# Patient Record
Sex: FEMALE | Race: WHITE | Employment: PART TIME | URBAN - METROPOLITAN AREA
[De-identification: names, ages, dates, MRNs, and addresses within clinical notes are randomized per-mention and may not be internally consistent; named-entity substitution may affect disease eponyms.]

---

## 2020-05-22 ENCOUNTER — MEDICAL CORRESPONDENCE (OUTPATIENT)
Dept: HEALTH INFORMATION MANAGEMENT | Facility: CLINIC | Age: 45
End: 2020-05-22

## 2020-05-27 ENCOUNTER — TRANSCRIBE ORDERS (OUTPATIENT)
Dept: OTHER | Age: 45
End: 2020-05-27

## 2020-05-27 DIAGNOSIS — I82.409 DVT (DEEP VENOUS THROMBOSIS) (H): Primary | ICD-10-CM

## 2020-05-27 DIAGNOSIS — D66 CONGENITAL FACTOR VIII DISORDER (H): ICD-10-CM

## 2020-06-16 ENCOUNTER — VIRTUAL VISIT (OUTPATIENT)
Dept: HEMATOLOGY | Facility: CLINIC | Age: 45
End: 2020-06-16
Attending: INTERNAL MEDICINE
Payer: COMMERCIAL

## 2020-06-16 VITALS — WEIGHT: 157 LBS

## 2020-06-16 DIAGNOSIS — Z86.711 HISTORY OF PULMONARY EMBOLISM: ICD-10-CM

## 2020-06-16 DIAGNOSIS — D68.51 FACTOR V LEIDEN MUTATION (H): ICD-10-CM

## 2020-06-16 DIAGNOSIS — Z86.718 HISTORY OF DEEP VENOUS THROMBOSIS: Primary | ICD-10-CM

## 2020-06-16 PROCEDURE — 99205 OFFICE O/P NEW HI 60 MIN: CPT | Mod: 95 | Performed by: INTERNAL MEDICINE

## 2020-06-16 RX ORDER — RIVAROXABAN 15 MG-20MG
KIT ORAL
COMMUNITY
Start: 2020-05-22 | End: 2023-07-19

## 2020-06-16 RX ORDER — MAGNESIUM OXIDE 400 MG/1
400 TABLET ORAL
COMMUNITY

## 2020-06-16 RX ORDER — DM/P-EPHED/ACETAMINOPH/DOXYLAM
5000 LIQUID (ML) ORAL
COMMUNITY

## 2020-06-16 NOTE — PROGRESS NOTES
[facility]  [facility]  [facility] [address], [place] [address]  Main: [phone], Fax: [phone]        Outpatient Clinic Visit  Date:  06/16/2020    NOTE:  Due to the ongoing COVID-19 pandemic, this visit was conducted by video, with the patient's approval.  Note that part way through the visit, due to technical issues with the video platform, the visit had to be converted to telephone.      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    She has a history of a left lower extremity deep vein thrombosis in approximately 2008.  At that time she had on oral contraceptives for at least several years.  She was subsequently found to be heterozygous for factor V Leiden.  She was anticoagulated with warfarin which was continued for approximately 1 year due to slow resolution of the clot.  Her understanding is that a follow-up ultrasound did, however, demonstrate complete clot resolution.  Because this is felt to be a provoked clot, she was not kept on long-term anticoagulation.    She presented about 3 weeks ago with a 2-week history of left leg pain and swelling.  This led to an ultrasound which was performed on May 22, 2020 that demonstrated an acute left posterior tibial deep vein thrombosis located in the mid calf.  This clot was not seen on prior ultrasound studies from January 2017 and April 2016.  She was placed on rivaroxaban.  Later that day, due to concerns about shortness of breath and chest discomfort she was seen in the emergency department where CT scan was performed and revealed small peripheral subsegmental pulmonary emboli in the right lower lobe and left lower lobe.  She had been having issues with shortness of breath that was slow to resolve following a severe respiratory infection back in February 2020.  In retrospect, she thinks this was likely  COVID-19 infection.    She returned to the emergency department on May 26, 2020 again with concerns about shortness of breath.  Echocardiogram was normal.  It was decided not to repeat the CT scan, and she was continued on rivaroxaban.    At the present time she is feeling better.  Her left leg symptoms resolved within a few days of starting anticoagulation.  She feels that her breathing is slowly improving and she has now just transitioned from the twice daily to the once daily dosing of rivaroxaban.  She denies any bleeding issues.    Overall, she feels healthy.  She is quite active, exercising regularly.  She [place] bike riding [place]  and 7-year-old son.    In addition to the past history of deep vein thrombosis, her past medical history is remarkable for a left upper extremity melanoma which was resected in 2011.  She has no other significant underlying health problems.  Current medications include rivaroxaban 20 mg once daily, magnesium and vitamin D supplements.    Family history is remarkable for a paternal aunt with a history of deep vein thrombosis and factor V Leiden.  Her first cousin (the same aunt's son) also has a history of thrombosis.  Details are unknown.  Her father has factor V Leiden but no history of thrombosis.  She has a sister with factor V Leiden and no history of thrombosis.  That sister had 3 pregnancies without complication.  Her maternal uncle also has factor V Leiden but no history of clotting.    Social history: She works as an endocrinologist.  No history of tobacco or alcohol abuse.  She is  and has a 7-year-old son.    Physical exam: Not performed.    Labs: Recent labs from May 2020 show normal CBC, creatinine, and LFTs.      ASSESSMENT / PLAN:  1.  History of left lower extremity deep thrombosis in approximately 2008.    Felt to be provoked, although she had been on oral contraceptives for at least several years    Treated with  warfarin for approximately 1 year    Ultrasound documented resolution of the clot  2.  Factor V Leiden, presumed heterozygous.  3.  Unprovoked distal left lower extremity deep vein thrombosis and bilateral pulmonary emboli, May 2020.    Given her history of now recurrent, and unprovoked venous thromboembolism, I would recommend long-term anticoagulation.  In addition, she has factor V Leiden (presumably heterozygous), although we discussed that this is a weak genetic risk factor.  Based on her family history, there is low suspicion for an additional identifiable thrombophilia present.  Thus, we decided not to proceed with additional hypercoagulable testing at this time, as it would not alter our treatment recommendations.    We discussed the importance of taking rivaroxaban at strict 24-hour dosing intervals and also with food to ensure proper absorption.  We also discussed the basic approach to brief interruptions in anticoagulation for invasive procedures.  She will contact us for further discussion in that regard should the need arise.    Additional recommendations:    Repeat ultrasound to assess for clot resolution at 3 to 6 months.    Remain up-to-date with age-appropriate cancer screening.    Annual reassessment of long-term anticoagulation plan.      Total time 60 minutes, all in counseling and coordination of care.        Fausto Zamora MD  Associate Professor of Medicine  Division of Hematology, Oncology, and Transplantation  Director, [facility]

## 2020-06-16 NOTE — PROGRESS NOTES
Patient was contacted to complete the pre-visit call prior to their video visit with the provider.  The following statement was read:       This visit will be billed to your insurance the same as an in-person visit. Because of Coronavirus we are instituting video visits when possible to keep everyone safe. This video visit will be conducted between you and the provider.  This service lets us provide the care you need with a video conversation.  If a prescription is necessary, we can send it directly to your pharmacy.If lab work or other testing is needed, we can help arrange a place/time for that to be done at a later date.If during the course of the call the provider feels a video visit is not appropriate, then your insurance company will not be billed.       Allergies and medications were reviewed and travel screening complete.     A test connection was Completed with Pt? No    I thanked them for their time to cover this information     Jean Paul Rois CMA

## 2020-11-17 ENCOUNTER — TELEPHONE (OUTPATIENT)
Dept: HEMATOLOGY | Facility: CLINIC | Age: 45
End: 2020-11-17

## 2020-11-17 NOTE — TELEPHONE ENCOUNTER
Britt Brunson  [id number]  1975    Britt Brunson called today to let us know that her lap hip surgery is going to take place at [facility].  She didn't know the exact timing of the surgery yet.  Reviewed Dr. Zamora's [place] message with anticoagulation plan.  She asked if she could take Ibuprofen for her post-op pain.  Dr. Zamora said that was fine and relayed this message to her.   Janene Rosales, MSN, RN, PHN -Nurse Clinician, [facility] [phone]

## 2020-12-18 ENCOUNTER — TELEPHONE (OUTPATIENT)
Dept: ONCOLOGY | Facility: CLINIC | Age: 45
End: 2020-12-18

## 2020-12-18 NOTE — TELEPHONE ENCOUNTER
Brief Heme/Onc On-Call Note  I received a page from the on-call line about whether the patient could take indomethacin for hip pain while also on Xarelto.    In review, Britt Brunson has a history of recurrent VTE for which she is on Xarelto.    She was prescribed a 4 day course of indomethacin after her lapparascopic hip labral surgery earlier today, and was wondering if she could continue to take that when she resumes her Xarelto tomorrow (she held it the last two days prior to surgery).    We reviewed Dr. Zamora's notes from earlier this year, including the telephone note from 11/17 which addressed NSAID use post-op. Based on his previous recommendations, and the similar half life between indomethacin and ibuprofen, I alerted her that it would be okay to take the indomethacin as she resumed her Xarelto.    However, I cautioned her that should she have obvious bleeding, or dark/black stools, with the combination of medications that she should call the on-call line or [facility] directly for further instructions.    Jono Day MD PhD  Heme/Onc/Transplant Fellow  Pgr [phone]

## 2021-01-04 ENCOUNTER — HEALTH MAINTENANCE LETTER (OUTPATIENT)
Age: 46
End: 2021-01-04

## 2021-05-29 ENCOUNTER — RECORDS - HEALTHEAST (OUTPATIENT)
Dept: ADMINISTRATIVE | Facility: CLINIC | Age: 46
End: 2021-05-29

## 2021-05-30 ENCOUNTER — RECORDS - HEALTHEAST (OUTPATIENT)
Dept: ADMINISTRATIVE | Facility: CLINIC | Age: 46
End: 2021-05-30

## 2021-05-31 ENCOUNTER — RECORDS - HEALTHEAST (OUTPATIENT)
Dept: ADMINISTRATIVE | Facility: CLINIC | Age: 46
End: 2021-05-31

## 2021-06-01 ENCOUNTER — RECORDS - HEALTHEAST (OUTPATIENT)
Dept: ADMINISTRATIVE | Facility: CLINIC | Age: 46
End: 2021-06-01

## 2021-06-02 ENCOUNTER — RECORDS - HEALTHEAST (OUTPATIENT)
Dept: ADMINISTRATIVE | Facility: CLINIC | Age: 46
End: 2021-06-02

## 2021-06-07 ENCOUNTER — VIRTUAL VISIT (OUTPATIENT)
Dept: HEMATOLOGY | Facility: CLINIC | Age: 46
End: 2021-06-07
Attending: INTERNAL MEDICINE
Payer: COMMERCIAL

## 2021-06-07 DIAGNOSIS — Z86.711 HISTORY OF PULMONARY EMBOLISM: ICD-10-CM

## 2021-06-07 DIAGNOSIS — Z86.718 HISTORY OF DEEP VENOUS THROMBOSIS: Primary | ICD-10-CM

## 2021-06-07 DIAGNOSIS — D68.51 FACTOR V LEIDEN MUTATION (H): ICD-10-CM

## 2021-06-07 DIAGNOSIS — Z79.01 CURRENT USE OF LONG TERM ANTICOAGULATION: ICD-10-CM

## 2021-06-07 PROCEDURE — 99213 OFFICE O/P EST LOW 20 MIN: CPT | Mod: 95 | Performed by: INTERNAL MEDICINE

## 2021-06-07 NOTE — PROGRESS NOTES
[facility]  [facility] for [facility]otting Disorders  [facility], [place] [address]  Main: [phone], Fax: [phone]        Outpatient Clinic Visit  Date:  06/07/2021    NOTE:  Due to the ongoing COVID-19 pandemic, this visit was conducted by video, with the patient's approval.      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    She has a history of a left lower extremity deep vein thrombosis in approximately 2008.  At that time she had on oral contraceptives for at least several years.  She was subsequently found to be heterozygous for factor V Leiden.  She was anticoagulated with warfarin which was continued for approximately 1 year due to slow resolution of the clot.  Her understanding is that a follow-up ultrasound did, however, demonstrate complete clot resolution.  Because this was felt to be a provoked clot, she was not kept on long-term anticoagulation.    She presented in May 2020 with a 2-week history of left leg pain and swelling.  This led to an ultrasound  that demonstrated an acute left posterior tibial deep vein thrombosis located in the mid calf.  This clot was not seen on prior ultrasound studies from January 2017 and April 2016.  She was placed on rivaroxaban.  Later that day, due to concerns about shortness of breath and chest discomfort she was seen in the emergency department where CT scan was performed and revealed small peripheral subsegmental pulmonary emboli in the right lower lobe and left lower lobe.  She had been having issues with shortness of breath that was slow to resolve following a severe respiratory infection back in February 2020.  In retrospect, she thinks this was likely COVID-19 infection.    We first saw her in June 2020, we recommended long-term anticoagulation.  She has remained on rivaroxaban since that time and  is tolerating it quite well.  She has minimal nuisance bleeding.  She does have post thrombotic syndrome symptoms in her left leg which have been present since the first clotting event in 2008.  These are improved with compression.  Overall this has remained stable.    Family history is remarkable for a paternal aunt with a history of deep vein thrombosis and factor V Leiden.  Her first cousin (the same aunt's son) also has a history of thrombosis.  Details are unknown.  Her father has factor V Leiden but no history of thrombosis.  She has a sister with factor V Leiden and no history of thrombosis.  That sister had 3 pregnancies without complication.  Her maternal uncle also has factor V Leiden but no history of clotting.    Physical exam: She looks well.  Detailed exam not performed (video visit).    Labs: No new labs were obtained as part of today's visit.      ASSESSMENT / PLAN:  1.  History of left lower extremity deep thrombosis in approximately 2008.    Felt to be provoked, although she had been on oral contraceptives for at least several years    Treated with warfarin for approximately 1 year    Ultrasound documented resolution of the clot  2.  Factor V Leiden, presumed heterozygous.  3.  Unprovoked distal left lower extremity deep vein thrombosis and bilateral pulmonary emboli, May 2020.  4.  Chronic post thrombotic syndrome symptoms, left leg, stable.    Given her history of recurrent, and unprovoked venous thromboembolism, we continue to feel that Britt is an appropriate candidate for long-term anticoagulation.  She is doing well on rivaroxaban with minimal nuisance bleeding.  We will make no change in her care plan today.  We previously decided not to proceed with additional hypercoagulable testing, as it would not alter our treatment recommendations.    We will see her back annually, to review her long-term anticoagulation plan.  She will let us know in the meantime if there are any new questions or  concerns.    Total time 20 minutes, including review of medical records and labs, video visit, and documentation.        Fausto Zamora MD  Associate Professor of Medicine  Division of Hematology, Oncology, and Transplantation  Director, [facility]

## 2021-07-22 ENCOUNTER — RECORDS - HEALTHEAST (OUTPATIENT)
Dept: SCHEDULING | Facility: CLINIC | Age: 46
End: 2021-07-22

## 2021-07-22 DIAGNOSIS — Z12.31 OTHER SCREENING MAMMOGRAM: ICD-10-CM

## 2021-10-10 ENCOUNTER — HEALTH MAINTENANCE LETTER (OUTPATIENT)
Age: 46
End: 2021-10-10

## 2021-12-05 ENCOUNTER — HEALTH MAINTENANCE LETTER (OUTPATIENT)
Age: 46
End: 2021-12-05

## 2022-01-30 ENCOUNTER — HEALTH MAINTENANCE LETTER (OUTPATIENT)
Age: 47
End: 2022-01-30

## 2022-03-30 ENCOUNTER — TELEPHONE (OUTPATIENT)
Dept: HEMATOLOGY | Facility: CLINIC | Age: 47
End: 2022-03-30
Payer: COMMERCIAL

## 2022-03-30 NOTE — TELEPHONE ENCOUNTER
Britt Brunson has history of VTE and is on long term anticoagulation with Xarelto.  SHe is scheduled to have a screening colonoscopy on 4/20/2022 at [facility] [address].  They are requesting a 3 day hold of Xarelto prior to this procedure.    Let [facility] staff know that this would be reviewed with Dr. Zamora and RN will call them back early next week.    Chica MUNOZ, RN   Nurse Clinician  [facility]  [facility]  Office: [phone]  Main Office: [phone] (ask to speak with a nurse)  Fax: [phone]       Per Dr. Zamora, ok to hold Xarelto for 3 days prior to procedure. If we had current normal creatinine, we could reduce hold to 2 days. Left VM for [facility] about the hold plan and sent MyChart to patient about getting a creatinine drawn.    Chica MUNOZ, RN   Nurse Clinician  [facility]  [facility]  Office: [phone]  Main Office: [phone] (ask to speak with a nurse)  Fax: [phone]

## 2022-05-09 ENCOUNTER — TELEPHONE (OUTPATIENT)
Dept: HEMATOLOGY | Facility: CLINIC | Age: 47
End: 2022-05-09
Payer: COMMERCIAL

## 2022-08-29 ENCOUNTER — VIRTUAL VISIT (OUTPATIENT)
Dept: HEMATOLOGY | Facility: CLINIC | Age: 47
End: 2022-08-29
Attending: INTERNAL MEDICINE
Payer: COMMERCIAL

## 2022-08-29 DIAGNOSIS — Z86.718 HISTORY OF DEEP VENOUS THROMBOSIS: Primary | ICD-10-CM

## 2022-08-29 DIAGNOSIS — Z86.711 HISTORY OF PULMONARY EMBOLISM: ICD-10-CM

## 2022-08-29 DIAGNOSIS — D68.51 FACTOR V LEIDEN MUTATION (H): ICD-10-CM

## 2022-08-29 DIAGNOSIS — Z79.01 CURRENT USE OF LONG TERM ANTICOAGULATION: ICD-10-CM

## 2022-08-29 PROCEDURE — 99213 OFFICE O/P EST LOW 20 MIN: CPT | Mod: 95 | Performed by: INTERNAL MEDICINE

## 2022-08-29 NOTE — PROGRESS NOTES
[facility]  Center for Bleeding [facility] Clotting Disorders  [facility], [address], [place] [address]  Main: [phone], Fax: [phone]          Outpatient Clinic Visit  Date:  08/29/2022    NOTE:  Due to the ongoing COVID-19 pandemic, this visit was conducted by video, with the patient's approval.      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    Background history:  She had a left lower extremity deep vein thrombosis in approximately 2008.  At that time she had on oral contraceptives for at least several years.  She was subsequently found to be heterozygous for factor V Leiden.  She was anticoagulated with warfarin which was continued for approximately 1 year due to slow resolution of the clot.  Her understanding is that a follow-up ultrasound did, however, demonstrate complete clot resolution.  Because this was felt to be a provoked clot, she was not kept on long-term anticoagulation.    She presented in May 2020 with a 2-week history of left leg pain and swelling.  Ultrasound demonstrated an acute left posterior tibial deep vein thrombosis located in the mid calf.  This clot was not seen on prior ultrasound studies from January 2017 and April 2016.  She was placed on rivaroxaban.  Later that day, due to concerns about shortness of breath and chest discomfort she was seen in the emergency department where CT scan was performed and revealed small peripheral subsegmental pulmonary emboli in the right lower lobe and left lower lobe.  She had been having issues with shortness of breath that was slow to resolve following a severe respiratory infection back in February 2020.  In retrospect, she thinks this was likely COVID-19 infection.    We first saw her in June 2020, we recommended long-term anticoagulation with rivaroxaban.    Family history is remarkable for a paternal  aunt with a history of deep vein thrombosis and factor V Leiden.  Her first cousin (the same aunt's son) also has a history of thrombosis.  Details are unknown.  Her father has factor V Leiden but no history of thrombosis.  She has a sister with factor V Leiden and no history of thrombosis.  That sister had 3 pregnancies without complication.  Her maternal uncle also has factor V Leiden but no history of clotting.    Interval history:  She remains on rivaroxaban and continues to tolerate it well.  She has minimal nuisance bleeding.  She remains physically quite active.  She does have post thrombotic syndrome symptoms in her left leg which have been present since the first clotting event in 2008.  These are unchanged.  She wears compression stockings as needed.    She asked about whether hormone replacement therapy for menopausal symptoms would be an option for her.  No other new questions or concerns.      Physical exam:   She looks well.  Detailed exam not performed (video visit).    Labs:   Recent (June 2022) labs in the [facility] system showed normal renal function.      ASSESSMENT / PLAN:  1.  History of left lower extremity deep thrombosis in approximately 2008.    Felt to be provoked, although she had been on oral contraceptives for at least several years    Treated with warfarin for approximately 1 year    Ultrasound documented resolution of the clot  2.  Factor V Leiden, presumed heterozygous.  3.  Unprovoked distal left lower extremity deep vein thrombosis and bilateral pulmonary emboli, May 2020.  4.  Chronic post thrombotic syndrome symptoms, left leg, stable.    Given her history of recurrent, and unprovoked venous thromboembolism, we continue to feel that Britt is an appropriate candidate for long-term anticoagulation.  She is doing well on rivaroxaban and we will make no change in her treatment plan today.  We previously decided not to proceed with additional hypercoagulable testing, as it would not alter  our treatment recommendations.    We discussed that as long as she remains on stable therapeutic anticoagulation, she can use hormone replacement therapy as needed for menopausal symptoms.    We should continue to see her back annually, sooner with any new questions or concerns.    Total time 20 minutes, including review of medical records and labs, video visit, and documentation.        Fausto Zamora MD  Professor of Medicine  Division of Hematology, Oncology, and Transplantation  Director, [facility]

## 2022-09-24 ENCOUNTER — HEALTH MAINTENANCE LETTER (OUTPATIENT)
Age: 47
End: 2022-09-24

## 2023-01-29 ENCOUNTER — HEALTH MAINTENANCE LETTER (OUTPATIENT)
Age: 48
End: 2023-01-29

## 2023-05-08 ENCOUNTER — HEALTH MAINTENANCE LETTER (OUTPATIENT)
Age: 48
End: 2023-05-08

## 2023-07-19 ENCOUNTER — VIRTUAL VISIT (OUTPATIENT)
Dept: HEMATOLOGY | Facility: CLINIC | Age: 48
End: 2023-07-19
Attending: INTERNAL MEDICINE
Payer: COMMERCIAL

## 2023-07-19 DIAGNOSIS — Z86.711 HISTORY OF PULMONARY EMBOLISM: ICD-10-CM

## 2023-07-19 DIAGNOSIS — D68.51 FACTOR V LEIDEN MUTATION (H): ICD-10-CM

## 2023-07-19 DIAGNOSIS — Z86.718 HISTORY OF DEEP VENOUS THROMBOSIS: Primary | ICD-10-CM

## 2023-07-19 DIAGNOSIS — Z79.01 CURRENT USE OF LONG TERM ANTICOAGULATION: ICD-10-CM

## 2023-07-19 PROCEDURE — 99213 OFFICE O/P EST LOW 20 MIN: CPT | Mod: VID | Performed by: INTERNAL MEDICINE

## 2023-07-19 RX ORDER — KETOCONAZOLE 20 MG/G
CREAM TOPICAL
COMMUNITY
Start: 2022-06-24

## 2023-07-19 RX ORDER — ESTRADIOL 0.1 MG/G
CREAM VAGINAL
COMMUNITY
Start: 2023-06-22

## 2023-07-19 RX ORDER — SPIRONOLACTONE 50 MG/1
1 TABLET, FILM COATED ORAL DAILY
COMMUNITY
Start: 2023-05-22

## 2023-07-19 NOTE — PROGRESS NOTES
[facility]  Center for Bleeding and Clotting Disorders  [address], [address], [place] [address]  Main: [phone], Fax: [phone]          Outpatient Clinic Visit  Date:  07/19/2023      NOTE:  This visit was conducted by video, with the patient's approval.  Patient location: Home  Provider location: Offsite       Britt Nannette is a 47-year-old woman with a history of recurrent venous thrombosis, whose visit today was to discuss her long-term anticoagulation plan.    Background history:  She had a left lower extremity deep vein thrombosis in approximately 2008.  At that time she had on oral contraceptives for at least several years.  She was subsequently found to be heterozygous for factor V Leiden.  She was anticoagulated with warfarin which was continued for approximately 1 year due to slow resolution of the clot.  Her understanding is that a follow-up ultrasound did, however, demonstrate complete clot resolution.  Because this was felt to be a provoked clot, she was not kept on long-term anticoagulation.    She presented in May 2020 with a 2-week history of left leg pain and swelling.  Ultrasound demonstrated an acute left posterior tibial deep vein thrombosis located in the mid calf.  This clot was not seen on prior ultrasound studies from January 2017 and April 2016.  She was placed on rivaroxaban.  Later that day, due to concerns about shortness of breath and chest discomfort she was seen in the emergency department where CT scan was performed and revealed small peripheral subsegmental pulmonary emboli in the right lower lobe and left lower lobe.  She had been having issues with shortness of breath that was slow to resolve following a severe respiratory infection back in February 2020.  In retrospect, she thinks this was likely COVID-19 infection.    We first saw her in June 2020, we recommended long-term anticoagulation with rivaroxaban.    Family history is  remarkable for a paternal aunt with a history of deep vein thrombosis and factor V Leiden.  Her first cousin (the same aunt's son) also has a history of thrombosis.  Details are unknown.  Her father has factor V Leiden but no history of thrombosis.  She has a sister with factor V Leiden and no history of thrombosis.  That sister had 3 pregnancies without complication.  Her maternal uncle also has factor V Leiden but no history of clotting.    Interval history:  She remains on rivaroxaban 20 mg daily and continues to tolerate it well.  She reports bruising with injuries, but no spontaneous bruising or any other bleeding symptoms.  She remains physically active.  She continues to have post thrombotic syndrome symptoms in her left leg which have been present since her first clotting event in 2008.  Overall this is stable.  She wears compression stockings while at work and when traveling which manages her symptoms adequately.  She is still considering starting hormone replacement therapy for menopausal symptoms.  She is waiting for evaluation in the cancer genetics clinic given a strong family history of breast cancer related to BRCA 1 (although she and her mother do not have the mutation).  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      Physical exam:   She looks well.  Detailed exam not performed (video visit).    Labs:   Recent (December 2022) labs in the [employer] system showed normal renal function.      ASSESSMENT / PLAN:  1.  History of left lower extremity deep thrombosis in approximately 2008.    Felt to be provoked, although she had been on oral contraceptives for at least several years    Treated with warfarin for approximately 1 year    Ultrasound documented resolution of the clot  2.  Factor V Leiden, presumed heterozygous.  3.  Unprovoked distal left lower extremity deep vein thrombosis and bilateral pulmonary emboli, May 2020.  4.  Chronic post thrombotic syndrome symptoms, left leg,  stable.    Given her history of recurrent, and unprovoked venous thromboembolism, Britt remains an appropriate candidate for long-term anticoagulation.  She is doing well on full dose rivaroxaban, without any concerning bleeding symptoms.  We previously decided not to proceed with additional hypercoagulable testing, as it would not alter our treatment recommendations.    We discussed that as long as she remains on stable therapeutic anticoagulation, she can use hormone replacement therapy as needed for menopausal symptoms.    We  we will continue to see her back annually, sooner with any new questions or concerns.    Total time 20 minutes, including review of medical records and labs, video visit, and documentation.        Fausto Zamora MD  Professor of Medicine  Division of Hematology, Oncology, and Transplantation  Director, [facility]

## 2023-07-19 NOTE — PROGRESS NOTES
Patient was contacted to complete the pre-visit call prior to their telephone visit with the provider.     Allergies and medications were reviewed.     I thanked them for their time to cover this information.     Ely Uribe MA

## 2024-07-14 ENCOUNTER — HEALTH MAINTENANCE LETTER (OUTPATIENT)
Age: 49
End: 2024-07-14

## 2024-10-28 ENCOUNTER — VIRTUAL VISIT (OUTPATIENT)
Dept: HEMATOLOGY | Facility: CLINIC | Age: 49
End: 2024-10-28
Attending: INTERNAL MEDICINE
Payer: COMMERCIAL

## 2024-10-28 DIAGNOSIS — Z86.711 HISTORY OF PULMONARY EMBOLISM: ICD-10-CM

## 2024-10-28 DIAGNOSIS — Z79.01 CURRENT USE OF LONG TERM ANTICOAGULATION: ICD-10-CM

## 2024-10-28 DIAGNOSIS — Z86.718 HISTORY OF DEEP VENOUS THROMBOSIS: Primary | ICD-10-CM

## 2024-10-28 DIAGNOSIS — D68.51 FACTOR V LEIDEN MUTATION (H): ICD-10-CM

## 2024-10-28 PROCEDURE — 99214 OFFICE O/P EST MOD 30 MIN: CPT | Mod: 95 | Performed by: INTERNAL MEDICINE

## 2024-10-28 NOTE — PROGRESS NOTES
[facility]  [facility]  [facility] [address], [address], [place] [address]  Main: [phone], Fax: [phone]          Outpatient Clinic Visit  Date:  10/28/2024      NOTE:  This visit was conducted by video, with the patient's approval.  Patient location: Home  Provider location: Offsite     Britt Nannette is a 49-year-old woman with a history of recurrent venous thrombosis, whose visit today was to discuss her long-term anticoagulation plan.  Last visit was in July 2023.    Background history:  She had a left lower extremity deep vein thrombosis in approximately 2008.  At that time she had on oral contraceptives for at least several years.  She was subsequently found to be heterozygous for factor V Leiden.  She was anticoagulated with warfarin which was continued for approximately 1 year due to slow resolution of the clot.  Her understanding is that a follow-up ultrasound did, however, demonstrate complete clot resolution.  Because this was felt to be a provoked clot, she was not kept on long-term anticoagulation.    She presented in May 2020 with a 2-week history of left leg pain and swelling.  Ultrasound demonstrated an acute left posterior tibial deep vein thrombosis located in the mid calf.  This clot was not seen on prior ultrasound studies from January 2017 and April 2016.  She was placed on rivaroxaban.  Later that day, due to concerns about shortness of breath and chest discomfort she was seen in the emergency department where CT scan was performed and revealed small peripheral subsegmental pulmonary emboli in the right lower lobe and left lower lobe.  She had been having issues with shortness of breath that was slow to resolve following a severe respiratory infection back in February 2020.  In retrospect, she thinks this was likely COVID-19 infection.    We first saw her in June 2020, we recommended long-term anticoagulation with  rivaroxaban.    Family history is remarkable for a paternal aunt with a history of deep vein thrombosis and factor V Leiden.  Her first cousin (the same aunt's son) also has a history of thrombosis.  Details are unknown.  Her father has factor V Leiden but no history of thrombosis.  She has a sister with factor V Leiden and no history of thrombosis.  That sister had 3 pregnancies without complication.  Her maternal uncle also has factor V Leiden but no history of clotting.    Interval history:  Britt has remained well over the last year.  She continues to tolerate rivaroxaban 20 mg daily without any bleeding issues.  She remains physically quite active.    She continues to have stable post thrombotic syndrome symptoms in her left leg which have been present since her first clotting event in 2008.  She wears compression stockings while at work and when traveling which manages her symptoms adequately.      She started hormone replacement therapy for menopausal symptoms a few months ago and reports that this has been quite helpful, particularly with her sleep quality.    On 11/22/2024 she is scheduled for hysteroscopy, endometrial biopsy, and IUD replacement.  She takes her rivaroxaban dose in the evening, and so we discussed holding it the day before and the day of the procedure, and resuming the day following, assuming no bleeding complications.    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      Physical exam:   She looks well.  Detailed exam not performed (video visit).    Labs:   Recent (December 2023) labs in the [employer] system showed normal renal function.      ASSESSMENT / PLAN:  1.  History of left lower extremity deep thrombosis in approximately 2008.  Felt to be provoked, although she had been on oral contraceptives for at least several years  Treated with warfarin for approximately 1 year  Ultrasound documented resolution of the clot  2.  Factor V Leiden, presumed heterozygous.  3.   Unprovoked distal left lower extremity deep vein thrombosis and bilateral pulmonary emboli, May 2020.  4.  Chronic post thrombotic syndrome symptoms, left leg, stable.    Given her history of recurrent, and unprovoked venous thromboembolism, Britt remains appropriate for long-term anticoagulation.  She continues to do well on therapeutic intensity rivaroxaban, without bleeding issues.  We will make no change in this part of her care plan.    We previously decided not to proceed with additional hypercoagulable testing, as it would not alter our treatment recommendations.    For the upcoming hysteroscopy, endometrial biopsy, and IUD replacement procedure scheduled on 11/22/2024, I recommended that she hold her rivaroxaban dose the day prior to and the day of the procedure (she takes the medication in the evening), and she can resume it the day following the procedure assuming no bleeding complications.    We  we will continue to see her back annually, sooner with any new questions or concerns.      Total time on date of encounter 30 minutes, including review of medical records and labs, video visit, and documentation.        Fausto Zamora MD  Professor of Medicine  Division of Hematology, Oncology, and Transplantation  Director, [facility]

## 2025-07-19 ENCOUNTER — HEALTH MAINTENANCE LETTER (OUTPATIENT)
Age: 50
End: 2025-07-19